# Patient Record
Sex: MALE | Race: OTHER | Employment: FULL TIME | URBAN - METROPOLITAN AREA
[De-identification: names, ages, dates, MRNs, and addresses within clinical notes are randomized per-mention and may not be internally consistent; named-entity substitution may affect disease eponyms.]

---

## 2018-06-16 ENCOUNTER — APPOINTMENT (OUTPATIENT)
Dept: CT IMAGING | Facility: HOSPITAL | Age: 53
End: 2018-06-16
Attending: EMERGENCY MEDICINE

## 2018-06-16 ENCOUNTER — HOSPITAL ENCOUNTER (EMERGENCY)
Facility: HOSPITAL | Age: 53
Discharge: HOME | End: 2018-06-17
Attending: EMERGENCY MEDICINE

## 2018-06-16 VITALS
OXYGEN SATURATION: 96 % | WEIGHT: 300 LBS | BODY MASS INDEX: 44.43 KG/M2 | HEART RATE: 87 BPM | TEMPERATURE: 98 F | DIASTOLIC BLOOD PRESSURE: 86 MMHG | HEIGHT: 69 IN | RESPIRATION RATE: 18 BRPM | SYSTOLIC BLOOD PRESSURE: 135 MMHG

## 2018-06-16 DIAGNOSIS — N23 RENAL COLIC: Primary | ICD-10-CM

## 2018-06-16 PROCEDURE — 83690 ASSAY OF LIPASE: CPT | Performed by: EMERGENCY MEDICINE

## 2018-06-16 PROCEDURE — 99285 EMERGENCY DEPT VISIT HI MDM: CPT

## 2018-06-16 PROCEDURE — 80048 BASIC METABOLIC PNL TOTAL CA: CPT | Performed by: EMERGENCY MEDICINE

## 2018-06-16 PROCEDURE — 81001 URINALYSIS AUTO W/SCOPE: CPT | Performed by: EMERGENCY MEDICINE

## 2018-06-16 PROCEDURE — 96361 HYDRATE IV INFUSION ADD-ON: CPT

## 2018-06-16 PROCEDURE — 85025 COMPLETE CBC W/AUTO DIFF WBC: CPT | Performed by: EMERGENCY MEDICINE

## 2018-06-16 PROCEDURE — 96375 TX/PRO/DX INJ NEW DRUG ADDON: CPT

## 2018-06-16 PROCEDURE — 74176 CT ABD & PELVIS W/O CONTRAST: CPT | Performed by: EMERGENCY MEDICINE

## 2018-06-16 PROCEDURE — 80076 HEPATIC FUNCTION PANEL: CPT | Performed by: EMERGENCY MEDICINE

## 2018-06-16 PROCEDURE — 96374 THER/PROPH/DIAG INJ IV PUSH: CPT

## 2018-06-16 RX ORDER — HYDROCODONE BITARTRATE AND ACETAMINOPHEN 5; 325 MG/1; MG/1
1-2 TABLET ORAL EVERY 4 HOURS PRN
Qty: 10 TABLET | Refills: 0 | Status: SHIPPED | OUTPATIENT
Start: 2018-06-16 | End: 2018-06-23

## 2018-06-16 RX ORDER — HYDROMORPHONE HYDROCHLORIDE 1 MG/ML
1 INJECTION, SOLUTION INTRAMUSCULAR; INTRAVENOUS; SUBCUTANEOUS ONCE
Status: COMPLETED | OUTPATIENT
Start: 2018-06-16 | End: 2018-06-16

## 2018-06-16 RX ORDER — TAMSULOSIN HYDROCHLORIDE 0.4 MG/1
0.4 CAPSULE ORAL DAILY
Qty: 7 CAPSULE | Refills: 0 | Status: SHIPPED | OUTPATIENT
Start: 2018-06-16 | End: 2018-06-23

## 2018-06-16 RX ORDER — ONDANSETRON 4 MG/1
4 TABLET, FILM COATED ORAL EVERY 8 HOURS PRN
Qty: 20 TABLET | Refills: 0 | Status: SHIPPED | OUTPATIENT
Start: 2018-06-16 | End: 2019-12-30

## 2018-06-16 RX ORDER — ONDANSETRON 2 MG/ML
4 INJECTION INTRAMUSCULAR; INTRAVENOUS ONCE
Status: COMPLETED | OUTPATIENT
Start: 2018-06-16 | End: 2018-06-16

## 2018-06-16 RX ORDER — MORPHINE SULFATE 4 MG/ML
4 INJECTION, SOLUTION INTRAMUSCULAR; INTRAVENOUS ONCE
Status: COMPLETED | OUTPATIENT
Start: 2018-06-16 | End: 2018-06-16

## 2018-06-16 RX ORDER — MORPHINE SULFATE 4 MG/ML
INJECTION, SOLUTION INTRAMUSCULAR; INTRAVENOUS
Status: COMPLETED
Start: 2018-06-16 | End: 2018-06-16

## 2018-06-16 RX ORDER — KETOROLAC TROMETHAMINE 30 MG/ML
30 INJECTION, SOLUTION INTRAMUSCULAR; INTRAVENOUS ONCE
Status: COMPLETED | OUTPATIENT
Start: 2018-06-16 | End: 2018-06-16

## 2018-06-16 RX ORDER — MORPHINE SULFATE 4 MG/ML
8 INJECTION, SOLUTION INTRAMUSCULAR; INTRAVENOUS ONCE
Status: COMPLETED | OUTPATIENT
Start: 2018-06-16 | End: 2018-06-16

## 2018-06-17 NOTE — ED NOTES
Endorsed care of patient from Azucena Glaser at this time- patient resting on bed- family at bedside. Awaiting further orders.

## 2018-06-17 NOTE — ED PROVIDER NOTES
Patient Seen in: [facility] AND [facility] Emergency Department     History   Patient presents with:  Abdomen/Flank Pain (GI/)    Stated Complaint: right lower quadrant pain    HPI    46year old male with 1 hour of acute onset severe right flank pain associated is without raccoon's eyes, without right periorbital erythema and without left periorbital erythema. Nose: Nose normal.   Mouth/Throat: Mucous membranes are normal. Mucous membranes are not pale and not cyanotic.    Eyes: Conjunctivae and lids are normal. The following orders were created for panel order CBC WITH DIFFERENTIAL WITH PLATELET.   Procedure                               Abnormality         Status                     ---------                               -----------         ------ present in the right upper pole. No left     hydronephrosis. Punctate left upper pole     calculus. GI/MESENTERY: Lack of enteric contrast limits assessment of the bowel. No     obstruction.  A normal caliber appendix is present in the right lower     q TempSrc: Temporal      SpO2: 98% 97% 98% 96%   Weight: 136.1 kg      Height: 175.3 cm (5' 9\")        *I personally reviewed and interpreted all ED vitals.     [facility]     Emergency Differential Diagnosis: Renal colic, pyelonephritis, less likely appendicitis above emergency department workup, the patient was found to have Renal colic  (primary encounter diagnosis)    Plan: General supportive care recommendations given to patient. Flomax, Norco, Zofran, follow-up with urology.   Further Outpatient evaluation an

## 2019-04-29 ENCOUNTER — APPOINTMENT (OUTPATIENT)
Dept: CT IMAGING | Facility: HOSPITAL | Age: 54
End: 2019-04-29
Attending: EMERGENCY MEDICINE

## 2019-04-29 ENCOUNTER — HOSPITAL ENCOUNTER (EMERGENCY)
Facility: HOSPITAL | Age: 54
Discharge: HOME | End: 2019-04-29
Attending: EMERGENCY MEDICINE

## 2019-04-29 VITALS
OXYGEN SATURATION: 100 % | TEMPERATURE: 97 F | DIASTOLIC BLOOD PRESSURE: 80 MMHG | SYSTOLIC BLOOD PRESSURE: 132 MMHG | HEART RATE: 70 BPM | RESPIRATION RATE: 18 BRPM

## 2019-04-29 DIAGNOSIS — N20.0 KIDNEY STONE: Primary | ICD-10-CM

## 2019-04-29 PROCEDURE — 96375 TX/PRO/DX INJ NEW DRUG ADDON: CPT

## 2019-04-29 PROCEDURE — 96361 HYDRATE IV INFUSION ADD-ON: CPT

## 2019-04-29 PROCEDURE — 80048 BASIC METABOLIC PNL TOTAL CA: CPT | Performed by: EMERGENCY MEDICINE

## 2019-04-29 PROCEDURE — 96376 TX/PRO/DX INJ SAME DRUG ADON: CPT

## 2019-04-29 PROCEDURE — 99284 EMERGENCY DEPT VISIT MOD MDM: CPT

## 2019-04-29 PROCEDURE — 74176 CT ABD & PELVIS W/O CONTRAST: CPT | Performed by: EMERGENCY MEDICINE

## 2019-04-29 PROCEDURE — 96374 THER/PROPH/DIAG INJ IV PUSH: CPT

## 2019-04-29 PROCEDURE — 85025 COMPLETE CBC W/AUTO DIFF WBC: CPT | Performed by: EMERGENCY MEDICINE

## 2019-04-29 RX ORDER — ONDANSETRON 2 MG/ML
4 INJECTION INTRAMUSCULAR; INTRAVENOUS ONCE
Status: COMPLETED | OUTPATIENT
Start: 2019-04-29 | End: 2019-04-29

## 2019-04-29 RX ORDER — MORPHINE SULFATE 4 MG/ML
4 INJECTION, SOLUTION INTRAMUSCULAR; INTRAVENOUS ONCE
Status: COMPLETED | OUTPATIENT
Start: 2019-04-29 | End: 2019-04-29

## 2019-04-29 RX ORDER — KETOROLAC TROMETHAMINE 15 MG/ML
15 INJECTION, SOLUTION INTRAMUSCULAR; INTRAVENOUS ONCE
Status: COMPLETED | OUTPATIENT
Start: 2019-04-29 | End: 2019-04-29

## 2019-04-29 RX ORDER — IBUPROFEN 600 MG/1
600 TABLET ORAL EVERY 8 HOURS PRN
Qty: 30 TABLET | Refills: 0 | Status: SHIPPED | OUTPATIENT
Start: 2019-04-29 | End: 2019-05-06

## 2019-04-29 NOTE — ED PROVIDER NOTES
Patient Seen in: [facility] AND [facility] Emergency Department    History   Patient presents with:  Abdomen/Flank Pain (GI/)    Stated Complaint: back pain    HPI    59-year-old male with past medical history significant for kidney stones presents to the matilda Back: Left CVA tenderness to percussion  :   Musculoskeletal: Normal range of motion. No deformity. Lymphadenopathy: No sig cervical LAD   Neurological: Awake, alert. Normal reflexes. No cranial nerve deficit. Skin: Skin is warm and dry.  No rash n suggestive of a recently passed stone. No additional nephrolithiasis. Incidental: Mild bilateral dependent atelectasis. Appendix is normal.  Small bilateral fat-containing inguinal hernias.     The results were faxed/finalized only at 04/29/2019 at 06:

## 2019-12-30 ENCOUNTER — OFFICE VISIT (OUTPATIENT)
Dept: FAMILY MEDICINE CLINIC | Facility: CLINIC | Age: 54
End: 2019-12-30

## 2019-12-30 VITALS
SYSTOLIC BLOOD PRESSURE: 155 MMHG | DIASTOLIC BLOOD PRESSURE: 90 MMHG | WEIGHT: 300 LBS | HEIGHT: 69 IN | OXYGEN SATURATION: 96 % | TEMPERATURE: 99 F | HEART RATE: 103 BPM | BODY MASS INDEX: 44.43 KG/M2

## 2019-12-30 DIAGNOSIS — R68.89 FLU-LIKE SYMPTOMS: ICD-10-CM

## 2019-12-30 DIAGNOSIS — B34.9 ACUTE VIRAL SYNDROME: Primary | ICD-10-CM

## 2019-12-30 PROCEDURE — 99203 OFFICE O/P NEW LOW 30 MIN: CPT | Performed by: NURSE PRACTITIONER

## 2019-12-30 RX ORDER — OSELTAMIVIR PHOSPHATE 75 MG/1
75 CAPSULE ORAL 2 TIMES DAILY
Qty: 10 CAPSULE | Refills: 0 | Status: SHIPPED | OUTPATIENT
Start: 2019-12-30 | End: 2020-01-04

## 2019-12-31 NOTE — PATIENT INSTRUCTIONS
Influenza (Adult)    Influenza is also called the flu. It is a viral illness that affects the air passages of your lungs. It is different from the common cold. The flu can easily be passed from one to person to another.  It may be spread through the air b If you are age 72 or older, talk with your provider about getting a pneumococcal vaccine every 5 years. You should also get this vaccine if you have chronic asthma or COPD. All adults should get a flu vaccine every fall. Ask your provider about this.   When Blood pressure measurements are given as 2 numbers. Systolic blood pressure is the upper number. This is the pressure when the heart contracts. Diastolic blood pressure is the lower number. This is the pressure when the heart relaxes between beats.  You harriet · Don’t take medicines that stimulate the heart. This includes many over-the-counter cold and sinus decongestant pills and sprays, as well as diet pills. Check the warnings about high blood pressure on the label.  Before buying any over-the-counter medicine · Sit with your back supported (don't sit on a couch or soft chair); keep your feet on the floor uncrossed. Place your arm on a solid flat surface (like a table) with the upper part of the arm at heart level.  Place the middle of the cuff directly above the · You have problems speaking or seeing   Date Last Reviewed: 12/1/2016  © 7410-6712 [employer]. [address] [facility] [place], [address] [place] [address]. All rights reserved. This information is not intended as a substitute for professional medical care.  A

## 2019-12-31 NOTE — PROGRESS NOTES
Patient presents with:  Sore Throat  Headache  Cough  :    HPI:   Jordyn Lopez is a 47year old male who presents for upper respiratory symptoms for  2  days. Started suddenly. Symptoms have been worsening since onset.   Feeling feverish, chills, headac NECK: supple, non-tender  LUNGS: clear to auscultation bilaterally. Breathing is non labored. Dry cough. CARDIO: RRR without murmur  EXTREMITIES: no cyanosis, clubbing or edema  LYMPH:  No cervical lymphadenopathy.         ASSESSMENT AND PLAN:   Kade Mobley Influenza is also called the flu. It is a viral illness that affects the air passages of your lungs. It is different from the common cold. The flu can easily be passed from one to person to another.  It may be spread through the air by coughing and sneezing If you are age 72 or older, talk with your provider about getting a pneumococcal vaccine every 5 years. You should also get this vaccine if you have chronic asthma or COPD. All adults should get a flu vaccine every fall. Ask your provider about this.   When Blood pressure measurements are given as 2 numbers. Systolic blood pressure is the upper number. This is the pressure when the heart contracts. Diastolic blood pressure is the lower number. This is the pressure when the heart relaxes between beats.  You harriet · Don’t take medicines that stimulate the heart. This includes many over-the-counter cold and sinus decongestant pills and sprays, as well as diet pills. Check the warnings about high blood pressure on the label.  Before buying any over-the-counter medicine · Sit with your back supported (don't sit on a couch or soft chair); keep your feet on the floor uncrossed. Place your arm on a solid flat surface (like a table) with the upper part of the arm at heart level.  Place the middle of the cuff directly above the · You have problems speaking or seeing   Date Last Reviewed: 12/1/2016  © 8598-4076 [employer]. [address]7 [facility] [place], [address] [place] [address]. All rights reserved. This information is not intended as a substitute for professional medical care.  A

## 2021-10-24 ENCOUNTER — IMMUNIZATION (OUTPATIENT)
Dept: LAB | Facility: HOSPITAL | Age: 56
End: 2021-10-24
Attending: EMERGENCY MEDICINE
Payer: OTHER GOVERNMENT

## 2021-10-24 DIAGNOSIS — Z23 NEED FOR VACCINATION: Primary | ICD-10-CM

## 2021-10-24 PROCEDURE — 0003A SARSCOV2 VAC 30MCG/0.3ML IM: CPT

## 2022-06-22 ENCOUNTER — IMMUNIZATION (OUTPATIENT)
Dept: LAB | Age: 57
End: 2022-06-22
Attending: EMERGENCY MEDICINE
Payer: OTHER GOVERNMENT

## 2022-06-22 DIAGNOSIS — Z23 NEED FOR VACCINATION: Primary | ICD-10-CM

## 2022-06-22 PROCEDURE — 0054A SARSCOV2 VAC 30MCG TRS SUCR: CPT

## 2023-07-05 ENCOUNTER — HOSPITAL ENCOUNTER (EMERGENCY)
Facility: HOSPITAL | Age: 58
Discharge: HOME | End: 2023-07-05
Attending: EMERGENCY MEDICINE

## 2023-07-05 ENCOUNTER — APPOINTMENT (OUTPATIENT)
Dept: GENERAL RADIOLOGY | Facility: HOSPITAL | Age: 58
End: 2023-07-05
Attending: EMERGENCY MEDICINE

## 2023-07-05 VITALS
SYSTOLIC BLOOD PRESSURE: 139 MMHG | HEIGHT: 72 IN | DIASTOLIC BLOOD PRESSURE: 84 MMHG | RESPIRATION RATE: 26 BRPM | OXYGEN SATURATION: 97 % | TEMPERATURE: 98 F | WEIGHT: 315 LBS | HEART RATE: 102 BPM | BODY MASS INDEX: 42.66 KG/M2

## 2023-07-05 DIAGNOSIS — S29.9XXA TRAUMATIC INJURY OF RIB: Primary | ICD-10-CM

## 2023-07-05 PROCEDURE — 71101 X-RAY EXAM UNILAT RIBS/CHEST: CPT | Performed by: EMERGENCY MEDICINE

## 2023-07-05 PROCEDURE — 99283 EMERGENCY DEPT VISIT LOW MDM: CPT

## 2023-07-05 PROCEDURE — 99284 EMERGENCY DEPT VISIT MOD MDM: CPT

## 2023-07-05 RX ORDER — HYDROCODONE BITARTRATE AND ACETAMINOPHEN 7.5; 325 MG/1; MG/1
1 TABLET ORAL EVERY 4 HOURS PRN
Qty: 10 TABLET | Refills: 0 | Status: SHIPPED | OUTPATIENT
Start: 2023-07-05 | End: 2023-07-10

## 2023-07-05 RX ORDER — HYDROCODONE BITARTRATE AND ACETAMINOPHEN 5; 325 MG/1; MG/1
1 TABLET ORAL ONCE
Status: COMPLETED | OUTPATIENT
Start: 2023-07-05 | End: 2023-07-05

## 2023-07-05 RX ORDER — IBUPROFEN 600 MG/1
600 TABLET ORAL ONCE
Status: COMPLETED | OUTPATIENT
Start: 2023-07-05 | End: 2023-07-05

## 2023-07-05 RX ORDER — IBUPROFEN 600 MG/1
600 TABLET ORAL EVERY 8 HOURS PRN
Qty: 30 TABLET | Refills: 0 | Status: SHIPPED | OUTPATIENT
Start: 2023-07-05 | End: 2023-07-12

## 2023-07-05 RX ORDER — CYCLOBENZAPRINE HCL 10 MG
10 TABLET ORAL 3 TIMES DAILY PRN
Qty: 20 TABLET | Refills: 0 | Status: SHIPPED | OUTPATIENT
Start: 2023-07-05 | End: 2023-07-12

## 2023-07-05 NOTE — ED INITIAL ASSESSMENT (HPI)
Pt to ED with c/o 6-8 feet fall off ladder at around 1320. Pt c/o right side rib pain and right leg pain. Pt denies head injury or LOC.

## 2023-07-06 NOTE — ED QUICK NOTES
Pt provided with discharge instruction, verbalized understand for plan of care at home and follow up. All question and concerns addressed prior to discharge.
good balance

## 2025-03-03 NOTE — ED PROVIDER NOTES
Patient Seen in: [facility] Emergency Department    History     Chief Complaint   Patient presents with    Chest Pain       HPI    59-year-old male presents ER with complaint of left-sided chest pain for the past 3 days.  Patient describes as a pressure that comes and goes.  Patient also with difficulty breathing.  Patient's oxygen saturation 97%.  Patient states he has not gone to a physician in a long time.  Patient was once diagnosed with hypercholesteremia but does not take medications    History reviewed. History reviewed. No pertinent past medical history.    History reviewed.   Past Surgical History:   Procedure Laterality Date    Hernia surgery      Umbilical hernia repair           Medications :  Prescriptions Prior to Admission[1]     History reviewed. No pertinent family history.    Smoking Status:   Social History     Socioeconomic History    Marital status:    Tobacco Use    Smoking status: Every Day    Smokeless tobacco: Never   Substance and Sexual Activity    Alcohol use: Yes    Drug use: Never       ROS  All pertinent positives for the review of systems are mentioned in the HPI  All other organ systems are reviewed and are negative.    Constitutional and vital signs reviewed.      Social History and Family History elements reviewed from today, pertinent positives to the presenting problem noted.    Physical Exam     ED Triage Vitals [03/03/25 0916]   BP (!) 165/101   Pulse 75   Resp 20   Temp 98.3 °F (36.8 °C)   Temp src Oral   SpO2 95 %   O2 Device None (Room air)       All measures to prevent infection transmission during my interaction with the patient were taken. The patient was already wearing a droplet mask on my arrival to the room. Personal protective equipment including droplet mask, eye protection, and gloves were worn throughout the duration of the exam.  Handwashing was performed prior to and after the exam.  Stethoscope and any equipment used during my examination was  cleaned with super sani-cloth germicidal wipes following the exam.     Physical Exam  Vitals and nursing note reviewed.   Constitutional:       Appearance: He is well-developed.   HENT:      Head: Normocephalic and atraumatic.      Right Ear: External ear normal.      Left Ear: External ear normal.      Nose: Nose normal.   Eyes:      Conjunctiva/sclera: Conjunctivae normal.      Pupils: Pupils are equal, round, and reactive to light.   Cardiovascular:      Rate and Rhythm: Normal rate and regular rhythm.      Heart sounds: Normal heart sounds.   Pulmonary:      Effort: Pulmonary effort is normal.      Breath sounds: Normal breath sounds.   Abdominal:      General: Bowel sounds are normal.      Palpations: Abdomen is soft.   Musculoskeletal:         General: Normal range of motion.      Cervical back: Normal range of motion and neck supple.   Skin:     General: Skin is warm and dry.   Neurological:      Mental Status: He is alert and oriented to person, place, and time.      Deep Tendon Reflexes: Reflexes are normal and symmetric.   Psychiatric:         Behavior: Behavior normal.         Thought Content: Thought content normal.         Judgment: Judgment normal.         ED Course        Labs Reviewed   COMP METABOLIC PANEL (14) - Abnormal; Notable for the following components:       Result Value    Glucose 125 (*)     AST 46 (*)     All other components within normal limits   CBC WITH DIFFERENTIAL WITH PLATELET - Abnormal; Notable for the following components:    .0 (*)     Immature Platelet Fraction 7.5 (*)     All other components within normal limits   TROPONIN I HIGH SENSITIVITY - Normal     EKG    Rate, intervals and axes as noted on EKG Report.  Rate: 79  Rhythm: Sinus Rhythm  Reading: No ST deviation, normal axis             Imaging Results Available and Reviewed while in ED: XR CHEST AP PORTABLE  (CPT=71045)    Result Date: 3/3/2025  CONCLUSION: No acute cardiopulmonary disease.    Dictated by (CST):  Rei Tenorio MD on 3/03/2025 at 12:00 PM     Finalized by (CST): Rei Tenorio MD on 3/03/2025 at 12:01 PM         ED Medications Administered: Medications - No data to display      MDM     Vitals:    03/03/25 0916 03/03/25 1115 03/03/25 1215   BP: (!) 165/101 (!) 136/92 (!) 137/100   Pulse: 75 80 79   Resp: 20 21 12   Temp: 98.3 °F (36.8 °C)     TempSrc: Oral     SpO2: 95% 97% 96%   Weight: (!) 158.8 kg     Height: 175.3 cm (5' 9\")       *I personally reviewed and interpreted all ED vitals.  I also personally reviewed all labs and imaging if ordered    Pulse Ox: 97%, Room air, Normal     Monitor Interpretation:   normal sinus rhythm    Differential Diagnosis/ Diagnostic Considerations: Chest pain, chest wall pain, muscle strain.    Medical Record Review: I personally reviewed available prior medical records for any recent pertinent discharge summaries, testing, and procedures and reviewed those reports.    Complicating Factors: The patient already has does not have a problem list on file. to contribute to the complexity of this ED evaluation.    Medical Decision Making  59-year-old male presents ER with complaint of chest pain on and off for the past 4 days.  Patient's blood work including EKG and chest x-ray within normal limits.  Patient feels comfortable being discharged home and states improved his pain.  Patient given follow-up with cardiology.  Patient's family was bedside made over the discharge plan disposition.    HEART score for chest pain  History- (Highly suspicious 2pt, Mod 1pt, slightly 0pt)        1  ECG- (significant ST deviation 2pt, Non spec 1pt, nl 0pt)  0  AGE- (>65 2pt, 45-64 1 pt, Under 45 0 pt)    1  Risk Factors- ( DM,HTN,Chol, fhx CAD, BMI>30, hx CAD)  ( >3 or hx CAD 2pt, 1-2 risks 1pt, None 0pt)  1  Troponin- ( 3 times nl 2pt, 2 times nl 1pt, nl 0pt   0         TOTAL  3    SCORE 0-3: 2.5% MACE over next 6 weeks consider D/C outpatient F/U  SCORE 4-6: 20.3% MACE over next 6 weeks  consider admit  SCORE 7-10:72.7% MACE over next 6 weeks consider early invasive strategy.    Patient has a HEART score of 3, plan will be discharge.       Problems Addressed:  Chest pain of uncertain etiology: acute illness or injury    Amount and/or Complexity of Data Reviewed  Independent Historian: spouse     Details: Medical history obtained from the spouse states that the patient with a history of postural email but still email but take himself off medications years ago.  Labs: ordered. Decision-making details documented in ED Course.  Radiology: ordered and independent interpretation performed. Decision-making details documented in ED Course.        Condition upon leaving the department: Stable    Disposition and Plan     Clinical Impression:  1. Chest pain of uncertain etiology        Disposition:  Discharge    Follow-up:  Marcin Younger MD  [address] [place] [address]  [facility] [address]  [phone]    Schedule an appointment as soon as possible for a visit  If symptoms worsen      Medications Prescribed:  There are no discharge medications for this patient.               [1] (Not in a hospital admission)

## 2025-03-05 NOTE — PATIENT INSTRUCTIONS
Will contact you/patient when the test(s):  lab (fasting cholesterol/lipids, 12 hour fast, nothing but water, be well-hydrated), result(s) are final and with further recommendations then if any changes.   (I was able to add other labs to your existing blood sample.)    Recommend:    Smoking cessation.    Weight loss/weight management with a lower carbohydrate, low salt, and low fat/cholesterol DASH diet.  Exercise recommendations will depend on your echocardiogram results and the cardiologist's recommendations.    Checking your blood pressure at home once to twice a day, vary the time of day.  Send me your blood pressure record with your heart rate (and the time of day taken) in about 2 weeks for my review.  I will give you additional recommendations then.    Medication(s), losartan 25 mg daily, take at bedtime, as prescribed.    Follow-up with me in 1 month for a blood pressure recheck.  Please get additional labs done about 2 days prior.  (A CMP for your kidney and liver function, and a CBC to recheck your platelets).  See me sooner if needed.     Follow-up with the cardiologist as recommended by them.    Follow-up with the bariatric clinic as soon as an appointment is available.    Go to the emergency room or call 911 for any new or suddenly worsening symptoms, any signs of acute distress, respiratory distress or emergent changes.

## 2025-03-05 NOTE — PROGRESS NOTES
HPI:   Kade Mao is a 59 year old male who presents for:     Patient presents with:  Blood Pressure follow-up, no diagnosis of hypertension, however patient has not been to see the doctor for many years    Patient seen in ED 2 days ago, for chest pain or pressure, comes and goes for the last few days   Test results from ED 2 days ago: EKG and troponin WL, CXR no cardiopulm disease    He subsequently saw cardiologist yesterday was told to see primary doctor for high BP. Patient has an echocardiogram scheduled at 4:30 today ordered by cardiology.  He was not started on medication.    CXR 3/3/202:  CARDIAC/MEDIAST: Borderline cardiomegaly.  Tortuous thoracic aorta.  No vascular congestion.   LUNGS/PLEURA: No focal opacity.  No pleural effusion.  No pneumothorax.   OTHER: Degenerative change osseous structures.         See ROS below for other pertinent positive and negative complaints.    I reviewed his's Past Medical History, Past Surgical History, Family and Social History    Labs:   Lab Results   Component Value Date/Time    WBC 5.0 03/03/2025 11:13 AM    HGB 15.8 03/03/2025 11:13 AM    .0 (L) 03/03/2025 11:13 AM      Lab Results   Component Value Date/Time     (H) 03/03/2025 11:13 AM     03/03/2025 11:13 AM    K 4.1 03/03/2025 11:13 AM     03/03/2025 11:13 AM    CO2 25.0 03/03/2025 11:13 AM    CREATSERUM 0.70 03/03/2025 11:13 AM    CA 8.7 03/03/2025 11:13 AM    ALB 4.0 03/03/2025 11:13 AM    TP 7.3 03/03/2025 11:13 AM    ALKPHO 88 03/03/2025 11:13 AM    AST 46 (H) 03/03/2025 11:13 AM    ALT 43 03/03/2025 11:13 AM    BILT 0.4 03/03/2025 11:13 AM        No results found for: \"CHOLEST\", \"HDL\", \"TRIG\", \"LDL\", \"NONHDLC\"        Current Outpatient Medications   Medication Sig Dispense Refill    losartan 25 MG Oral Tab Take 1 tablet (25 mg total) by mouth at bedtime. 90 tablet 0      History reviewed. No pertinent past medical history.   Past Surgical History:   Procedure Laterality Date     Hernia surgery      Umbilical hernia repair        History reviewed. No pertinent family history.  Allergies[1]   Social History:  Social History     Socioeconomic History    Marital status:    Tobacco Use    Smoking status: Every Day    Smokeless tobacco: Never   Substance and Sexual Activity    Alcohol use: Yes    Drug use: Never         REVIEW OF SYSTEMS:   Review of Systems   Constitutional:  Negative for fatigue.   HENT: Negative.     Eyes: Negative.    Respiratory:  Positive for chest tightness. Negative for cough and shortness of breath.    Cardiovascular:  Positive for chest pain. Negative for palpitations and leg swelling.   Gastrointestinal: Negative.    Genitourinary: Negative.    Musculoskeletal: Negative.    Skin: Negative.    Neurological:  Positive for dizziness. Negative for weakness and light-headedness.   Psychiatric/Behavioral: Negative.         EXAM:   BP (!) 162/98   Pulse 73   Ht 5' 9\" (1.753 m)   Wt (!) 355 lb (161 kg)   BMI 52.42 kg/m²  Body mass index is 52.42 kg/m².  Physical Exam  Vitals and nursing note reviewed.   Constitutional:       General: He is not in acute distress.     Appearance: Normal appearance. He is well-developed and well-groomed. He is obese. He is not ill-appearing, toxic-appearing or diaphoretic.   HENT:      Head: Normocephalic.      Nose: Nose normal.   Eyes:      General: No scleral icterus.     Extraocular Movements: Extraocular movements intact.      Conjunctiva/sclera: Conjunctivae normal.   Neck:      Thyroid: No thyroid mass or thyromegaly.      Vascular: No carotid bruit.      Trachea: Trachea normal.   Cardiovascular:      Rate and Rhythm: Normal rate and regular rhythm.      Pulses: Normal pulses.      Heart sounds: Normal heart sounds, S1 normal and S2 normal. No murmur heard.     No S3 or S4 sounds.   Pulmonary:      Effort: Pulmonary effort is normal. No respiratory distress.      Breath sounds: Normal breath sounds.   Abdominal:       General: Bowel sounds are normal. There is no distension.      Palpations: Abdomen is soft. There is no mass.      Tenderness: There is no abdominal tenderness. There is no guarding or rebound.   Musculoskeletal:         General: No deformity or signs of injury.      Cervical back: Neck supple. No rigidity or tenderness.      Right lower leg: No edema.      Left lower leg: No edema.   Lymphadenopathy:      Head:      Right side of head: No preauricular or posterior auricular adenopathy.      Left side of head: No preauricular or posterior auricular adenopathy.      Cervical: No cervical adenopathy.      Right cervical: No superficial, deep or posterior cervical adenopathy.     Left cervical: No superficial, deep or posterior cervical adenopathy.   Skin:     General: Skin is warm and dry.      Capillary Refill: Capillary refill takes less than 2 seconds.      Findings: No rash.   Neurological:      General: No focal deficit present.      Mental Status: He is alert and oriented to person, place, and time.      Motor: Motor function is intact.      Coordination: Coordination is intact.   Psychiatric:         Attention and Perception: Attention and perception normal.         Mood and Affect: Mood and affect normal.         Speech: Speech normal.         Behavior: Behavior normal. Behavior is cooperative.         Thought Content: Thought content normal.         Cognition and Memory: Cognition and memory normal.         Judgment: Judgment normal.         ASSESSMENT AND PLAN:   1. Kade Mao is a 59 year old male who presents for:      1. Essential hypertension, BP remains elevated at home and at office, no acute distress, EKG and troponin negative in ER, patient was not started on antihypertensive medication when seen by cardiologist yesterday  Recommend starting losartan 25 mg daily, check electrolytes and kidney function in about 4 weeks  Patient to monitor blood pressure at home  Check echocardiogram today as  ordered by cardiology  Follow-up with me for recheck in 1 month  See additional recommendations and follow-up below    - losartan 25 MG Oral Tab; Take 1 tablet (25 mg total) by mouth at bedtime.  Dispense: 90 tablet; Refill: 0    2. Other chest pain  Intermittent, patient to follow-up with cardiology as recommended by them    3. Hyperglycemia  Check hemoglobin A1c, follow-up pending results  - Hemoglobin A1C; Future    4. Hypercholesteremia  Not on medication, check fasting lipids, follow-up pending results  - Lipid Panel; Future    5. Thrombocytopenia  Recheck in 1 month prior to office visit with me    6. Elevated liver enzymes  Recheck in 1 month prior to office visit with me    7. Tobacco dependence  Recommend smoking cessation    8. Routine medical exam  Check labs as ordered, follow-up pending results and for complete physical exam, okay to do at next office visit  - Lipid Panel; Future  - TSH W Reflex To Free T4; Future  - PSA, Total W Reflex To Free; Future  - Hemoglobin A1C; Future    9. Prostate cancer screening  - PSA, Total W Reflex To Free; Future    10. Class 3 severe obesity due to excess calories with serious comorbidity and body mass index (BMI) of 50.0 to 59.9 in adult (HCC)  Recommend bariatrics evaluation and treatment  See just recommendations and follow-up below  - BARIATRICS - INTERNAL    Patient Instructions   Will contact you/patient when the test(s):  lab (fasting cholesterol/lipids, 12 hour fast, nothing but water, be well-hydrated), result(s) are final and with further recommendations then if any changes.   (I was able to add other labs to your existing blood sample.)    Recommend:    Smoking cessation.    Weight loss/weight management with a lower carbohydrate, low salt, and low fat/cholesterol DASH diet.  Exercise recommendations will depend on your echocardiogram results and the cardiologist's recommendations.    Checking your blood pressure at home once to twice a day, vary the time  of day.  Send me your blood pressure record with your heart rate (and the time of day taken) in about 2 weeks for my review.  I will give you additional recommendations then.    Medication(s), losartan 25 mg daily, take at bedtime, as prescribed.    Follow-up with me in 1 month for a blood pressure recheck.  Please get additional labs done about 2 days prior.  (A CMP for your kidney and liver function, and a CBC to recheck your platelets).  See me sooner if needed.     Follow-up with the cardiologist as recommended by them.    Follow-up with the bariatric clinic as soon as an appointment is available.    Go to the emergency room or call 911 for any new or suddenly worsening symptoms, any signs of acute distress, respiratory distress or emergent changes.      Follow up: as above (See AVS)    All questions were answered.  We discussed the indications, proper use, risks, and benefits of the above recommendations including any medication(s) as prescribed.  The patient (and/or guardian or parent if less than 18 years old) indicate(s) understanding and agree(s) to the above plan of care.    Orders Placed This Encounter   Procedures    Lipid Panel    TSH W Reflex To Free T4    PSA, Total W Reflex To Free    Hemoglobin A1C     Meds & Refills for this Visit:  Requested Prescriptions     Signed Prescriptions Disp Refills    losartan 25 MG Oral Tab 90 tablet 0     Sig: Take 1 tablet (25 mg total) by mouth at bedtime.     Other Orders, Imaging & Consults:  BARIATRICS - INTERNAL    Sridevi Pool MD         [1] No Known Allergies

## 2025-03-07 NOTE — PROGRESS NOTES
Aguila Mao,    Attached are the results of your recently performed labs/tests:    All results are essentially within NORMAL limits.    EXCEPT:    Your hemoglobin A1c, a test for diabetes and prediabetes, was elevated at 6.6, and at a diabetic level.  Your LDL cholesterol was mildly elevated at 111.    I recommend that you see a diabetic educator and dietitian for consultation.  Call our central scheduling at  [phone].  Please also see the bariatric clinic for weight management as referred at your recent office visit with me.    Recheck the hemoglobin A1c in 3 to 4 months and your cholesterol in 6 months (or per the Cardiologist's recommendations).    Follow-up with me in one month for your blood pressure recheck and in 3-4  months, a few days after your labs have been completed.    Take care,     Sridevi Pool MD  3/7/2025    (Report(s) are attached, future lab/test orders or any referrals are in your chart/MyChart or will be mailed to you)

## 2025-03-07 NOTE — PROGRESS NOTES
Aguila Mao,    Attached are the results of your recently performed labs/tests:    See my previous result note to you.    Take care,     Sridevi Pool MD  3/7/2025    (Report(s) are attached, future lab/test orders or any referrals are in your chart/MyChart or will be mailed to you)

## 2025-03-19 NOTE — TELEPHONE ENCOUNTER
Action Requested: Summary for Provider     []  Critical Lab, Recommendations Needed  [] Need Additional Advice  [x]   FYI    []   Need Orders  [] Need Medications Sent to Pharmacy  []  Other     SUMMARY: Elevated BP readings since onset; now BP is 167/98 HR 79; past 3 days at night with upper back pain and left shoulder pain [with movement and laying down]. Enlarged varicose veins by knees bilaterally. Pressure of ears bilaterally. Had Emergency Department visit on 3/3/25 [patient previous XR on 3/3/25 revealed borderline cardiomegaly and tortuous aorta]. Advised Emergency Department or Immediate Care now--> agreeable to [facility]. [See below for more details]     Reason for call: Blood Pressure and HTN  Onset: past 2 weeks    Reason for Disposition   Patient sounds very sick or weak to the triager    Protocols used: Blood Pressure - High-A-OH      Patient called back, he states he has been having elevated BP readings for about 2 weeks; highest reading on 3/18/25 of 154/103. Patient's /84 HR 81 today at 11 am. Now BP on left arm, while sitting with both feet flat on the floor, uncrossed [at 2:45 pm] is 167/98 HR 79. Denies any shortness of breath, chest pain, and/or chest tightness, visual changes. Each night for the past 3 days he has had some pain of his upper back and left shoulder-->pain when present 3/10 with movement, not constant; denies sweating. Denies any paresthesias or jaw pain. He also feels some pressure of of his ears bilaterally. Wife also states patient has not smoked in many years and it is listed at last office visit he smokes and part of his history. Denies any headache, nausea, dizziness, edema, unprovoked Bruising/bleeding. He has an upcoming visit with Cardiology for evaluation and testing. He has varicose veins bilaterally, Left > Right with pain present-->denies redness or warmth. He has a slight cough that is intermittent. Refer to system/assessment yes/no answers.  Advised Immediate Care or Emergency Department now for evaluation [patient previous XR on 3/3/25 revealed borderline cardiomegaly and tortuous aorta]--> agreeable to [facility]. Patient instructed any new or worsening symptoms [reviewed] seek immediate medical attention--> Emergency Department/911. Visit scheduled in 1 week for follow-up Immediate Care visit after assured patient will go to Immediate Care as advised. Patient verbalized understanding. No further questions or concerns at this time.    Future Appointments   Date Time Provider Department Center   3/26/2025  3:00 PM [facility]

## 2025-03-19 NOTE — TELEPHONE ENCOUNTER
Entered patient reported vitals; patient had an office visit on 3/5/25 with Dr. Pool    See RN Triage from today    Future Appointments   Date Time Provider Department Center   4/7/2025  6:00 PM Sridevi Pool MD [facility] [place]

## 2025-03-19 NOTE — DISCHARGE INSTRUCTIONS
Follow-up with your primary care provider for reevaluation of your blood pressure in the next week.    Continue taking your blood pressure medicines daily until told otherwise    Take Tylenol every 6 hours if needed for pain.    Continue eating a good heart healthy diet and moving to help with your cardiovascular health.    Go to ER for any worsening chest pain, shortness of breath, dizziness, numbness or tingling to extremities, vomiting, neck pain, vision changes

## 2025-03-19 NOTE — TELEPHONE ENCOUNTER
Patient reported vitals, and BP readings have been elevated for the last few days see below.    Can a nurse please follow-up with him?        Thank you,  Lucy CUNHA MA

## 2025-03-19 NOTE — ED PROVIDER NOTES
Patient Seen in: [facility]      History     Chief Complaint   Patient presents with    Chest Pain    Back Pain     Stated Complaint: Back Pain/ Shoulder pain.    Subjective:   HPI    steffany Alejo [id number] used for history, exam and plan.    59 yr old male here for evaluation of chest pain to left side that is on and off, worse at night x 3 days. Pain radiates to left shoulder and scapula. Denies neck pain, abd pain, headache or dizziness, vision changes, numbness or tingling to extremities, shortness of breath, fever or chills. He has not had any cough/viral symptoms recently. Denies any MVC, fall, trauma. 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 He reports having similar chest pain 2 weeks ago, seen in ER, had workup and sent home. He followed up with primary the next day and started on Losartan 25mg. He also saw a cardiologist then and had an ECHO performed but still waiting on results. He has been taking this and checking BP twice daily and still elevated. He reports pain better since 2 weeks ago but then 3 days ago increased a bit, still better than original chest pain. He denies travel, sick contacts, tobacco smoking currently (quit 2 yrs ago), blood thinner use, hx dvt/pe. He has     Objective:     History reviewed. No pertinent past medical history.           Past Surgical History:   Procedure Laterality Date    Hernia surgery      Umbilical hernia repair                  Social History     Socioeconomic History    Marital status:    Tobacco Use    Smoking status: Every Day    Smokeless tobacco: Never   Substance and Sexual Activity    Alcohol use: Yes    Drug use: Never              Review of Systems    Positive for stated complaint: Back Pain/ Shoulder pain.  Other systems are as noted in HPI.  Constitutional and vital signs reviewed.      All other systems reviewed and negative except as noted above.    Physical Exam     ED Triage Vitals  [03/19/25 1551]   /88   Pulse 77   Resp 18   Temp 97.8 °F (36.6 °C)   Temp src Oral   SpO2 97 %   O2 Device None (Room air)       Current Vitals:   Vital Signs  BP: (!) 149/99  Pulse: 71  Resp: 18  Temp: 97.8 °F (36.6 °C)  Temp src: Oral    Oxygen Therapy  SpO2: 97 %  O2 Device: None (Room air)        Physical Exam  Vitals and nursing note reviewed.   Constitutional:       General: He is not in acute distress.     Appearance: He is well-developed. He is not ill-appearing, toxic-appearing or diaphoretic.   HENT:      Head: Normocephalic.   Eyes:      Pupils: Pupils are equal, round, and reactive to light.   Neck:      Vascular: No JVD.   Cardiovascular:      Rate and Rhythm: Normal rate and regular rhythm.      Heart sounds: Normal heart sounds.   Pulmonary:      Effort: Pulmonary effort is normal. No tachypnea, accessory muscle usage or respiratory distress.      Breath sounds: Normal breath sounds and air entry. No stridor, decreased air movement or transmitted upper airway sounds. No decreased breath sounds, wheezing, rhonchi or rales.   Chest:      Chest wall: No mass, tenderness or crepitus.   Abdominal:      General: Abdomen is protuberant. Bowel sounds are normal.      Palpations: Abdomen is soft.      Tenderness: There is no abdominal tenderness. There is no guarding or rebound.   Musculoskeletal:         General: Normal range of motion.      Cervical back: Normal range of motion and neck supple.      Right lower leg: No edema.      Left lower leg: No edema.   Skin:     Findings: No rash.   Neurological:      Mental Status: He is alert and oriented to person, place, and time.   Psychiatric:         Mood and Affect: Mood normal.         Behavior: Behavior normal.           ED Course     Labs Reviewed   POCT CBC - Abnormal; Notable for the following components:       Result Value    PLT .0 (*)     All other components within normal limits   POCT ISTAT CHEM8 CARTRIDGE - Abnormal; Notable for the  following components:    ISTAT Glucose 109 (*)     All other components within normal limits   D-DIMER (POC) - Normal   ISTAT TROPONIN - Normal     EKG    Rate, intervals and axes as noted on EKG Report.  Rate: 74  Rhythm: Sinus Rhythm  Reading: SR with PVCs, otherwise normal ECG           ED Course as of 03/19/25 1755  ------------------------------------------------------------  Time: 03/19 1611  Value: EKG 12 Lead  Comment: Sinus rhythm with occasional Premature ventricular complexes  Otherwise normal ECG  When compared with ECG of 03-MAR-2025 09:28,  Premature ventricular complexes are now Present      ------------------------------------------------------------  Time: 03/19 1646  Value: XR CHEST PA + LAT CHEST (CVN=[id number])  Comment: Impression  CONCLUSION: No acute cardiopulmonary process    ------------------------------------------------------------  Time: 03/19 1650  Value: PLT IC(!): 115.0  Comment: (Reviewed)  ------------------------------------------------------------  Time: 03/19 1657  Value: ISTAT GLUCOSE(!): 109  Comment: (Reviewed)  ------------------------------------------------------------  Time: 03/19 1711  Value: D-Dimer,Triage  Comment: (Reviewed)  ------------------------------------------------------------  Time: 03/19 1718  Value: ISTAT Troponin  Comment: (Reviewed)            MDM     59 yr old male here for chest pain x 3 days worse at night, radiating to left scapula and shoulder. No SOB, fever, dizziness, abd pain.    ON exam, pt well appearing, lungs clear with no wheezing or crackles. Good air movement. Pharynx clear with airway patent. Soft, obese abdomen with no tenderness. No CVAT> nontender spinal process. Pain over left scapula. No rash to chest or back.    Differential diagnoses reflecting the complexity of care include but are not limited to: Chest pain reasons considered:  PE- Diimer NEG, no sob, vss  PNA- no infectious findings, normal xray  Esophageal rupture- swallows  normally  ACS- Trop neg, labs do not indicate this  Pneumothorax- chest xray normal  Aortic dissection not likely, no abdominal pain, BP is normal      Comorbidities that add complexity to management include: newly diagnosed HTN  History obtained by an independent source was from: patient, wife  My independent interpretations of studies include: EKG NSR with PVCs, not present on last EKG  Shared decision making was done by: patient, wife, myself, Dr Alcantara  Discussions of management was done with: patient, wife, Dr Alcantara    Patient is well appearing, non-toxic and in no acute distress.  Vital signs are stable.   CBC unremarkable   Chem unremarkable  TROP neg  DIMER neg  CXR neg    Discussed results. Tylenol PO given for pain. Advised on re-eval BP with PCP this week for better management as readings have been high and he is having symptoms. Advised to call cards for report on ECHO.  Discussed PO fluids, rest, avoid strenuous activities. Discussed worsening dizziness, sob, cp, vision changes and BP elevated he needs to go to ER.  Family, wife, and daughters here for dc info, agree with plan.  All questions answered. Return and ER precautions given.    Counseled: Patient, regarding diagnosis, regarding treatment plan, regarding diagnostic results, regarding prescription, I have discussed with the patient the results of tests, differential diagnosis, and warning signs and symptoms that should prompt immediate return. The patient understands these instructions and agrees to the follow-up plan provided. There is no barriers to learning. Appropriate f/u given. Patient agrees to return for any concerns/ problems/complications.          Medical Decision Making      Disposition and Plan     Clinical Impression:  1. Chest pain of uncertain etiology    2. Elevated blood pressure reading         Disposition:  Discharge  3/19/2025  5:36 pm    Follow-up:  Sridevi Pool MD  [address] [place]  [address]  [phone]    Schedule an appointment as soon as possible for a visit in 2 days  BP check and medication evaluation.          Medications Prescribed:  Discharge Medication List as of 3/19/2025  5:37 PM              Supplementary Documentation:

## 2025-03-19 NOTE — ED INITIAL ASSESSMENT (HPI)
Patient reports chest pain, left shoulder, and upper back pain x 3 days. Had similar symptoms earlier in the month, was seen in ER and was given BP medications. Patient reports BP has been in the 160s at home, and was advised by pcp to go be evaluated.

## 2025-03-19 NOTE — TELEPHONE ENCOUNTER
[Patient sent [employer] message yesterday with elevated BP readings--> see message with readings]    [phone] (Last signed Verbal Release verified) I called patient in [place] related to reported elevated BP readings-->voicemail not set up [1st attempt]    [phone] (Last signed Verbal Release verified)-->Left message to call back in [place]; made aware of [employer] message sent [1st attempt]    RN Triage - please check if patient read [employer] message AND called back, if not please make 2nd attempt to call to assess patient related HTN/elevated BP readings    ENGLISH TRANSLATION OF [employer] MESSAGE SENT IS BELOW:  Good Afternoon Kade,    I left you a voicemail message requesting a call back related to your recently reported blood pressure readings via [employer] you sent, please call our office at [phone], say \"nurse\" to speak with a nurse. You may also ask for a .    Our phone hours are:  Monday - Friday 8 AM - 4:30 PM  Saturday  Closed  Sunday   Closed    Thank You,  Jenny, RN-BSN

## 2025-03-20 NOTE — TELEPHONE ENCOUNTER
Trinity Chopra, APRN   Nurse Practitioner  Specialty: Nurse Practitioner Family     ED Provider Notes     Cosign Needed     Date of Service: 3/19/2025  4:06 PM     Cosign Needed       Expand All Collapse All       Patient Seen in: [facility]        History          Chief Complaint   Patient presents with    Chest Pain    Back Pain

## 2025-04-07 NOTE — PATIENT INSTRUCTIONS
Will contact you/patient when the test(s):  lab-PLATELETS DUE IN 1 MONTH, DIABETES TESTS AND LIPIDS IN 4 MONTHS, STRESS TEST-NOW AND [facility] WILL CALL YOU TO DELIVER BOX, result(s) are final and with further recommendations then if any changes.     Recommend:    Medication(s), LOSARTAN, NOW 50 MG AT BEDTIME, as prescribed.    Checking your blood pressure at home once to twice a day, vary the time of day.  Send me your blood pressure record with your heart rate (and the time of day taken) in about 3 weeks (START TO TAKE IN 2 WEEKS) for my review.  I will give you additional recommendations then.    Follow-up with me IN 4 MONTHS, or sooner as needed.    Follow-up with the BARIATRIC/WEIGHT LOSS CLINIC OR ENDOCRINOLOGIST NOW, DIABETIC DIETICIAN NOW, AND OPHTHALMOLOGIST (FOR DIABETIC EYE EXAM) as soon as an appointment is available      Go to the emergency room or call 911 for any new or suddenly worsening symptoms, any signs of acute distress, respiratory distress or emergent changes.

## 2025-04-07 NOTE — PROGRESS NOTES
HPI:   Kade Mao is a 59 year old male who presents for:     Patient presents with:  Follow - Up: Blood pressure     Hypertension recheck:  Home Blood Pressure monitoring has been borderline, blood pressures at home, lowest systolic 138, highest 160s, diastolic BP average 90s  Current medication: see below-losartan 25 mg  Pt has been taking medications as instructed.  Patient has been tolerating the medication(s) well. No side effects.   No CP, Palpitations, persistent Dizziness, Recent fall, SOB, Cough, LOC, frequent HAs    Reviewed labs, hemoglobin A1c 6.6-new diagnosis of diabetes today, patient has decreased his caloric intake, noncompliant with diet overall, he has not made an appointment with the weight loss clinic yet          See ROS below for other pertinent positive and negative complaints.    I reviewed his's Past Medical History, Past Surgical History, Family and Social History    Labs:   Lab Results   Component Value Date/Time    WBC 5.0 03/03/2025 11:13 AM    HGB 15.8 03/03/2025 11:13 AM    .0 (L) 03/03/2025 11:13 AM      Lab Results   Component Value Date/Time     (H) 03/03/2025 11:13 AM     03/03/2025 11:13 AM    K 4.1 03/03/2025 11:13 AM     03/03/2025 11:13 AM    CO2 25.0 03/03/2025 11:13 AM    CREATSERUM 0.70 03/03/2025 11:13 AM    CA 8.7 03/03/2025 11:13 AM    ALB 4.0 03/03/2025 11:13 AM    TP 7.3 03/03/2025 11:13 AM    ALKPHO 88 03/03/2025 11:13 AM    AST 46 (H) 03/03/2025 11:13 AM    ALT 43 03/03/2025 11:13 AM    BILT 0.4 03/03/2025 11:13 AM    TSH 2.114 03/03/2025 11:13 AM        Lab Results   Component Value Date/Time    CHOLEST 184 03/07/2025 08:00 AM    HDL 54 03/07/2025 08:00 AM    TRIG 103 03/07/2025 08:00 AM     (H) 03/07/2025 08:00 AM    NONHDLC 130 (H) 03/07/2025 08:00 AM           Current Outpatient Medications   Medication Sig Dispense Refill    losartan 50 MG Oral Tab Take 1 tablet (50 mg total) by mouth at bedtime. 90 tablet 0       History reviewed. No pertinent past medical history.   Past Surgical History:   Procedure Laterality Date    Hernia surgery      Umbilical hernia repair        History reviewed. No pertinent family history.  Allergies[1]   Social History:  Social History     Socioeconomic History    Marital status:    Tobacco Use    Smoking status: Never    Smokeless tobacco: Never   Vaping Use    Vaping status: Never Used   Substance and Sexual Activity    Alcohol use: Yes    Drug use: Never         REVIEW OF SYSTEMS:   Review of Systems   Constitutional: Negative.    HENT: Negative.     Eyes: Negative.    Respiratory: Negative.     Cardiovascular: Negative.    Gastrointestinal: Negative.    Genitourinary: Negative.    Musculoskeletal:  Positive for arthralgias.   Skin: Negative.    Neurological: Negative.    Psychiatric/Behavioral: Negative.         EXAM:   BP (!) 161/82 (BP Location: Left arm)   Pulse 78   Ht 5' 9\" (1.753 m)   Wt (!) 358 lb (162.4 kg)   BMI 52.87 kg/m²  Body mass index is 52.87 kg/m².  Physical Exam  Vitals and nursing note reviewed.   Constitutional:       General: He is not in acute distress.     Appearance: Normal appearance. He is well-developed and well-groomed. He is obese. He is not ill-appearing, toxic-appearing or diaphoretic.   HENT:      Head: Normocephalic.   Eyes:      General: No scleral icterus.     Extraocular Movements: Extraocular movements intact.      Conjunctiva/sclera: Conjunctivae normal.   Neck:      Thyroid: No thyroid mass or thyromegaly.      Vascular: No carotid bruit.      Trachea: Trachea normal.   Cardiovascular:      Rate and Rhythm: Normal rate and regular rhythm.      Pulses: Normal pulses.      Heart sounds: Normal heart sounds, S1 normal and S2 normal. No murmur heard.     No S3 or S4 sounds.   Pulmonary:      Effort: Pulmonary effort is normal. No respiratory distress.      Breath sounds: Normal breath sounds.   Abdominal:      General: Bowel sounds are normal.    Musculoskeletal:         General: No deformity. Normal range of motion.      Cervical back: Neck supple. No rigidity or tenderness.      Right lower leg: No edema.      Left lower leg: No edema.   Lymphadenopathy:      Head:      Right side of head: No preauricular or posterior auricular adenopathy.      Left side of head: No preauricular or posterior auricular adenopathy.      Cervical: No cervical adenopathy.      Right cervical: No superficial, deep or posterior cervical adenopathy.     Left cervical: No superficial, deep or posterior cervical adenopathy.   Skin:     General: Skin is warm and dry.      Capillary Refill: Capillary refill takes less than 2 seconds.      Findings: No rash.   Neurological:      General: No focal deficit present.      Mental Status: He is alert and oriented to person, place, and time.      Motor: Motor function is intact.      Coordination: Coordination is intact.   Psychiatric:         Attention and Perception: Attention and perception normal.         Mood and Affect: Mood and affect normal.         Speech: Speech normal.         Behavior: Behavior normal. Behavior is cooperative.         Thought Content: Thought content normal.         Cognition and Memory: Cognition and memory normal.         Judgment: Judgment normal.         ASSESSMENT AND PLAN:   1. Kade Mao is a 59 year old male who presents for:    1. Essential hypertension  Increase losartan from 25 mg to 50 mg daily, patient to send me his blood pressure readings in about 3 weeks, will give additional recommendations then as indicated  Recommend cardiac stress test, patient does not have insurance and will be self-pay, declines nuclear stress test, treadmill stress test ordered as a a lower cost alternative-f/u P results  Follow-up in office in 4 months or sooner as needed  - losartan 50 MG Oral Tab; Take 1 tablet (50 mg total) by mouth at bedtime.  Dispense: 90 tablet; Refill: 0  - CARD TREADMILL STRESS, ADULT  (CPT=93017); Future    2. Type 2 diabetes mellitus without complication, without long-term current use of insulin ([facility])  Lab results reviewed with patient:  Lab Results   Component Value Date    A1C 6.6 (H) 03/03/2025     Diabetic control is:  borderline controlled, needs improvement, needs to follow diet more regularly.  Plan:  I recommend Weight and diabetic/lipid management with a well balanced lower carbohydrate, fat and cholesterol diet and exercise 4-5 times a week, minimum 30+ minutes at a time (as tolerated).  Recommend bariatric or endocrinology evaluation and treatment now to discuss weight loss options and GLP-1 treatment, patient is self-pay which may factor availability  Check HgbA1C, CMP: In 4 months  Check fasting lipids: In 4 months  Check urine for micro albumin annually-due now (OK to do with the platelets in 1 month)  Refer to Ophthalmology annually for routine eye exam  Check feet daily.  Podiarty evaluation prn.  Follow up pending test results and in 4 months    - ENDOCRINOLOGY - INTERNAL  - OPHTHALMOLOGY - INTERNAL  - Hemoglobin A1C; Future  - Comp Metabolic Panel (14); Future  - CARD TREADMILL STRESS, ADULT (CPT=93017); Future  - DIETITIAN EDUCATION INITIAL, DIET (INTERNAL)    3. Class 3 severe obesity due to excess calories with serious comorbidity and body mass index (BMI) of 50.0 to 59.9 in adult ([facility])  See above  - ENDOCRINOLOGY - INTERNAL  - CARD TREADMILL STRESS, ADULT (CPT=93017); Future  - DIETITIAN EDUCATION INITIAL, DIET (INTERNAL)    4. Elevated liver enzymes  Recheck with next labs  - Comp Metabolic Panel (14); Future    5. Dyslipidemia  See above  - Lipid Panel; Future  - Comp Metabolic Panel (14); Future  - CARD TREADMILL STRESS, ADULT (CPT=93017); Future  - DIETITIAN EDUCATION INITIAL, DIET (INTERNAL)    6. Thrombocytopenia  Recheck 1 month  - PLATELET COUNT [723] [Q]; Future    7. Screening for colon cancer  Patient declined colonoscopy due to cost, he is self-pay, he is aware  that Cologuard is less accurate than colonoscopy as screening tool for colon cancer, order faxed to [facility], follow-up pending results  - COLOGUARD COLON CANCER SCREENING (EXTERNAL)    8. B knee pain, likely OA by history, recommend OTC Voltaren gel bid, (vs Rx due to cost), f/u prn and as below    Patient Instructions   Will contact you/patient when the test(s):  lab-PLATELETS DUE IN 1 MONTH, DIABETES TESTS AND LIPIDS IN 4 MONTHS, STRESS TEST-NOW AND COLOG[facility]ARD-THE WILL CALL YOU TO DELIVER BOX, result(s) are final and with further recommendations then if any changes.     Recommend:    Medication(s), LOSARTAN, NOW 50 MG AT BEDTIME, as prescribed.    Checking your blood pressure at home once to twice a day, vary the time of day.  Send me your blood pressure record with your heart rate (and the time of day taken) in about 3 weeks (START TO TAKE IN 2 WEEKS) for my review.  I will give you additional recommendations then.    Follow-up with me IN 4 MONTHS, or sooner as needed.    Follow-up with the BARIATRIC/WEIGHT LOSS CLINIC OR ENDOCRINOLOGIST NOW, DIABETIC DIETICIAN NOW, AND OPHTHALMOLOGIST (FOR DIABETIC EYE EXAM) as soon as an appointment is available      Go to the emergency room or call 911 for any new or suddenly worsening symptoms, any signs of acute distress, respiratory distress or emergent changes.      Follow up: as above (See AVS)    All questions were answered.  We discussed the indications, proper use, risks, and benefits of the above recommendations including any medication(s) as prescribed.  The patient (and/or guardian or parent if less than 18 years old) indicate(s) understanding and agree(s) to the above plan of care.    Orders Placed This Encounter   Procedures    Hemoglobin A1C    Lipid Panel    Comp Metabolic Panel (14)    PLATELET COUNT [723] [Q]    Microalb/Creat Ratio, Random Urine     Meds & Refills for this Visit:  Requested Prescriptions     Signed Prescriptions Disp Refills    losartan 50  MG Oral Tab 90 tablet 0     Sig: Take 1 tablet (50 mg total) by mouth at bedtime.     Other Orders, Imaging & Consults:  COLOGUARD COLON CANCER SCREENING (EXTERNAL)  ENDOCRINOLOGY - INTERNAL  OPHTHALMOLOGY - INTERNAL  DIETITIAN EDUCATION INITIAL, DIET (INTERNAL)  CARD TREADMILL STRESS, ADULT (JXL=78145)    Sridevi Pool MD       [1] No Known Allergies

## 2025-07-09 NOTE — TELEPHONE ENCOUNTER
Losartan refilled    Please ask pt to send me his blood pressure record with heart rate (and the time of day taken) for my review as requested at his appt 4/7/2025  I will give him additional recommendations then.    Thanks!  Sridevi Pool M.D.

## 2025-07-10 NOTE — TELEPHONE ENCOUNTER
With  ID # [id number]    Spoke to wife Rosa (on [facility]) advised ' message and Rosa verbalized understanding.

## (undated) NOTE — ED AVS SNAPSHOT
Jac Gomez   MRN: [id number]    Department:  [facility] Emergency Department   Date of Visit:  4/29/2019           Disclosure     Insurance plans vary and the physician(s) referred by the ER may not be covered by your plan.  Please contact CARE PHYSICIAN AT ONCE OR RETURN IMMEDIATELY TO THE EMERGENCY DEPARTMENT. If you have been prescribed any medication(s), please fill your prescription right away and begin taking the medication(s) as directed.   If you believe that any of the medications

## (undated) NOTE — LETTER
Date & Time: 7/5/2023, 6:46 PM  Patient: Noel Hahn  Encounter Provider(s):    Christina Nicholas MD       To Whom It May Concern:    Noel Hahn was seen and treated in our department on 7/5/2023. He can return to work 7/7/23.     If you have any questions or concerns, please do not hesitate to call.        _____________________________  Physician/APC Signature

## (undated) NOTE — ED AVS SNAPSHOT
Ethan Strickland   MRN: [id number]    Department:  [facility] Emergency Department   Date of Visit:  6/16/2018           Disclosure     Insurance plans vary and the physician(s) referred by the ER may not be covered by your plan.  Please contact CARE PHYSICIAN AT ONCE OR RETURN IMMEDIATELY TO THE EMERGENCY DEPARTMENT. If you have been prescribed any medication(s), please fill your prescription right away and begin taking the medication(s) as directed.   If you believe that any of the medications